# Patient Record
Sex: MALE | Race: WHITE | Employment: FULL TIME | URBAN - METROPOLITAN AREA
[De-identification: names, ages, dates, MRNs, and addresses within clinical notes are randomized per-mention and may not be internally consistent; named-entity substitution may affect disease eponyms.]

---

## 2017-08-04 PROBLEM — L57.0 KERATOSIS: Status: ACTIVE | Noted: 2017-08-04

## 2017-08-04 PROBLEM — R00.2 PALPITATIONS: Status: ACTIVE | Noted: 2017-08-04

## 2017-08-04 PROBLEM — I45.6 ANOMALOUS ATRIOVENTRICULAR EXCITATION: Status: ACTIVE | Noted: 2017-08-04

## 2017-08-04 PROBLEM — R07.89 ATYPICAL CHEST PAIN: Status: ACTIVE | Noted: 2017-08-04

## 2017-08-04 PROBLEM — G43.109 MIGRAINE WITH VERTIGO: Status: ACTIVE | Noted: 2017-08-04

## 2017-08-04 PROBLEM — K40.90 INGUINAL HERNIA: Status: ACTIVE | Noted: 2017-08-04

## 2017-08-04 PROBLEM — F43.10 PTSD (POST-TRAUMATIC STRESS DISORDER): Status: ACTIVE | Noted: 2017-08-04

## 2017-08-04 PROBLEM — I45.6 WPW (WOLFF-PARKINSON-WHITE SYNDROME): Status: ACTIVE | Noted: 2017-08-04

## 2017-08-04 PROBLEM — K21.9 GERD (GASTROESOPHAGEAL REFLUX DISEASE): Status: ACTIVE | Noted: 2017-08-04

## 2017-08-04 PROBLEM — J30.9 ALLERGIC RHINITIS: Status: ACTIVE | Noted: 2017-08-04

## 2017-08-04 PROBLEM — R42 VERTIGO: Status: ACTIVE | Noted: 2017-08-04

## 2017-08-04 PROBLEM — G43.109 MIGRAINE WITH VERTIGO: Status: RESOLVED | Noted: 2017-08-04 | Resolved: 2017-08-04

## 2017-08-04 PROBLEM — C44.121: Status: ACTIVE | Noted: 2017-08-04

## 2017-08-04 PROBLEM — F41.1 GENERALIZED ANXIETY DISORDER: Status: ACTIVE | Noted: 2017-08-04

## 2017-08-04 PROBLEM — E78.5 DYSLIPIDEMIA: Status: ACTIVE | Noted: 2017-08-04

## 2021-02-04 PROBLEM — I49.3 PVC (PREMATURE VENTRICULAR CONTRACTION): Status: ACTIVE | Noted: 2021-02-04

## 2021-03-01 ENCOUNTER — HOSPITAL ENCOUNTER (OUTPATIENT)
Dept: LAB | Age: 51
Discharge: HOME | End: 2021-03-01
Payer: COMMERCIAL

## 2021-03-01 DIAGNOSIS — D89.2 HYPERGAMMAGLOBULINEMIA: ICD-10-CM

## 2021-03-01 LAB
ALBUMIN SERPL-MCNC: 4.4 G/DL (ref 3.5–5)
ALBUMIN/GLOB SERPL: 1.1 {RATIO} (ref 1.2–3.5)
ALP SERPL-CCNC: 59 U/L (ref 50–136)
ALT SERPL-CCNC: 27 U/L (ref 12–65)
ANION GAP SERPL CALC-SCNC: 7 MMOL/L (ref 7–16)
AST SERPL-CCNC: 16 U/L (ref 15–37)
BASOPHILS # BLD: 0 K/UL (ref 0–0.2)
BASOPHILS NFR BLD: 0 % (ref 0–2)
BILIRUB SERPL-MCNC: 0.4 MG/DL (ref 0.2–1.1)
BUN SERPL-MCNC: 10 MG/DL (ref 6–23)
CALCIUM SERPL-MCNC: 9.6 MG/DL (ref 8.3–10.4)
CHLORIDE SERPL-SCNC: 103 MMOL/L (ref 98–107)
CO2 SERPL-SCNC: 27 MMOL/L (ref 21–32)
CREAT SERPL-MCNC: 1.1 MG/DL (ref 0.8–1.5)
DIFFERENTIAL METHOD BLD: NORMAL
EOSINOPHIL # BLD: 0.1 K/UL (ref 0–0.8)
EOSINOPHIL NFR BLD: 1 % (ref 0.5–7.8)
ERYTHROCYTE [DISTWIDTH] IN BLOOD BY AUTOMATED COUNT: 12.6 % (ref 11.9–14.6)
GLOBULIN SER CALC-MCNC: 4 G/DL (ref 2.3–3.5)
GLUCOSE SERPL-MCNC: 96 MG/DL (ref 65–100)
HCT VFR BLD AUTO: 45 % (ref 41.1–50.3)
HGB BLD-MCNC: 15.5 G/DL (ref 13.6–17.2)
IMM GRANULOCYTES # BLD AUTO: 0 K/UL (ref 0–0.5)
IMM GRANULOCYTES NFR BLD AUTO: 0 % (ref 0–5)
LDH SERPL L TO P-CCNC: 165 U/L (ref 100–190)
LYMPHOCYTES # BLD: 1.7 K/UL (ref 0.5–4.6)
LYMPHOCYTES NFR BLD: 36 % (ref 13–44)
MCH RBC QN AUTO: 31.6 PG (ref 26.1–32.9)
MCHC RBC AUTO-ENTMCNC: 34.4 G/DL (ref 31.4–35)
MCV RBC AUTO: 91.8 FL (ref 79.6–97.8)
MONOCYTES # BLD: 0.5 K/UL (ref 0.1–1.3)
MONOCYTES NFR BLD: 10 % (ref 4–12)
NEUTS SEG # BLD: 2.5 K/UL (ref 1.7–8.2)
NEUTS SEG NFR BLD: 53 % (ref 43–78)
NRBC # BLD: 0 K/UL (ref 0–0.2)
PLATELET # BLD AUTO: 302 K/UL (ref 150–450)
PMV BLD AUTO: 9.6 FL (ref 9.4–12.3)
POTASSIUM SERPL-SCNC: 3.6 MMOL/L (ref 3.5–5.1)
PROT SERPL-MCNC: 8.4 G/DL (ref 6.3–8.2)
RBC # BLD AUTO: 4.9 M/UL (ref 4.23–5.67)
SODIUM SERPL-SCNC: 137 MMOL/L (ref 136–145)
URATE SERPL-MCNC: 6.6 MG/DL (ref 2.6–6)
WBC # BLD AUTO: 4.8 K/UL (ref 4.3–11.1)

## 2021-03-01 PROCEDURE — 86334 IMMUNOFIX E-PHORESIS SERUM: CPT

## 2021-03-01 PROCEDURE — 36415 COLL VENOUS BLD VENIPUNCTURE: CPT

## 2021-03-01 PROCEDURE — 84550 ASSAY OF BLOOD/URIC ACID: CPT

## 2021-03-01 PROCEDURE — 85025 COMPLETE CBC W/AUTO DIFF WBC: CPT

## 2021-03-01 PROCEDURE — 83615 LACTATE (LD) (LDH) ENZYME: CPT

## 2021-03-01 PROCEDURE — 80053 COMPREHEN METABOLIC PANEL: CPT

## 2021-03-01 PROCEDURE — 84165 PROTEIN E-PHORESIS SERUM: CPT

## 2021-03-01 PROCEDURE — 88185 FLOWCYTOMETRY/TC ADD-ON: CPT

## 2021-03-01 PROCEDURE — 88184 FLOWCYTOMETRY/ TC 1 MARKER: CPT

## 2021-03-01 PROCEDURE — 83883 ASSAY NEPHELOMETRY NOT SPEC: CPT

## 2021-03-02 LAB
KAPPA LC FREE SER-MCNC: 21.52 MG/L (ref 3.3–19.4)
KAPPA LC FREE/LAMBDA FREE SER: 1.78 {RATIO} (ref 0.26–1.65)
LAMBDA LC FREE SERPL-MCNC: 12.08 MG/L (ref 5.71–26.3)

## 2021-03-03 LAB
ALBUMIN SERPL ELPH-MCNC: 4.15 G/DL (ref 3.2–5.6)
ALBUMIN/GLOB SERPL: 1.1 {RATIO}
ALPHA1 GLOB SERPL ELPH-MCNC: 0.18 G/DL (ref 0.1–0.4)
ALPHA2 GLOB SERPL ELPH-MCNC: 0.64 G/DL (ref 0.4–1.2)
B-GLOBULIN SERPL QL ELPH: 1.27 G/DL (ref 0.6–1.3)
GAMMA GLOB MFR SERPL ELPH: 1.65 G/DL (ref 0.5–1.6)
IGA SERPL-MCNC: 252 MG/DL (ref 85–499)
IGG SERPL-MCNC: 1296 MG/DL (ref 610–1616)
IGM SERPL-MCNC: 286 MG/DL (ref 35–242)
M PROTEIN SERPL ELPH-MCNC: ABNORMAL G/DL
PROT PATTERN SERPL ELPH-IMP: ABNORMAL
PROT PATTERN SPEC IFE-IMP: ABNORMAL
PROT SERPL-MCNC: 7.9 G/DL (ref 6.3–8.2)

## 2021-03-08 ENCOUNTER — HOSPITAL ENCOUNTER (OUTPATIENT)
Dept: CT IMAGING | Age: 51
Discharge: HOME | End: 2021-03-08
Attending: INTERNAL MEDICINE

## 2021-03-08 DIAGNOSIS — R14.0 ABDOMINAL BLOATING: ICD-10-CM

## 2021-03-08 DIAGNOSIS — D89.2 HYPERGAMMAGLOBULINEMIA: ICD-10-CM

## 2021-03-08 RX ORDER — SODIUM CHLORIDE 0.9 % (FLUSH) 0.9 %
10 SYRINGE (ML) INJECTION
Status: COMPLETED | OUTPATIENT
Start: 2021-03-08 | End: 2021-03-08

## 2021-03-08 RX ADMIN — Medication 10 ML: at 14:22

## 2021-03-11 LAB
FLOW CYTOMETRY, FBTC1: NORMAL
SPECIMEN SOURCE: NORMAL
TEST ORDERED:: NORMAL

## 2021-11-23 ENCOUNTER — HOSPITAL ENCOUNTER (OUTPATIENT)
Dept: LAB | Age: 51
Discharge: HOME | End: 2021-11-23

## 2021-11-23 PROCEDURE — 88305 TISSUE EXAM BY PATHOLOGIST: CPT

## 2022-03-18 PROBLEM — J30.9 ALLERGIC RHINITIS: Status: ACTIVE | Noted: 2017-08-04

## 2022-03-18 PROBLEM — L57.0 KERATOSIS: Status: ACTIVE | Noted: 2017-08-04

## 2022-03-18 PROBLEM — F43.10 PTSD (POST-TRAUMATIC STRESS DISORDER): Status: ACTIVE | Noted: 2017-08-04

## 2022-03-18 PROBLEM — I45.6 ANOMALOUS ATRIOVENTRICULAR EXCITATION: Status: ACTIVE | Noted: 2017-08-04

## 2022-03-19 PROBLEM — R07.89 ATYPICAL CHEST PAIN: Status: ACTIVE | Noted: 2017-08-04

## 2022-03-19 PROBLEM — K40.90 INGUINAL HERNIA: Status: ACTIVE | Noted: 2017-08-04

## 2022-03-19 PROBLEM — I49.3 PVC (PREMATURE VENTRICULAR CONTRACTION): Status: ACTIVE | Noted: 2021-02-04

## 2022-03-19 PROBLEM — C44.121: Status: ACTIVE | Noted: 2017-08-04

## 2022-03-19 PROBLEM — R42 VERTIGO: Status: ACTIVE | Noted: 2017-08-04

## 2022-03-19 PROBLEM — E78.5 DYSLIPIDEMIA: Status: ACTIVE | Noted: 2017-08-04

## 2022-03-19 PROBLEM — F41.1 GENERALIZED ANXIETY DISORDER: Status: ACTIVE | Noted: 2017-08-04

## 2022-03-20 PROBLEM — R00.2 PALPITATIONS: Status: ACTIVE | Noted: 2017-08-04

## 2022-03-20 PROBLEM — K21.9 GERD (GASTROESOPHAGEAL REFLUX DISEASE): Status: ACTIVE | Noted: 2017-08-04

## 2022-03-20 PROBLEM — I45.6 WPW (WOLFF-PARKINSON-WHITE SYNDROME): Status: ACTIVE | Noted: 2017-08-04

## 2023-10-20 ENCOUNTER — TELEPHONE (OUTPATIENT)
Age: 53
End: 2023-10-20

## 2023-10-20 NOTE — TELEPHONE ENCOUNTER
Pt states PCP instructed him to call to get f/u appt for SOB, and some dizziness. Last OV 2021. Appt scheduled 11/22 at 10:45.

## 2023-11-17 ENCOUNTER — TELEPHONE (OUTPATIENT)
Age: 53
End: 2023-11-17

## 2023-11-17 NOTE — TELEPHONE ENCOUNTER
Spoke to pt. Made aware Dr. Camille Brooks recommends general cardiology consult. Pt confirms he is not having issues with tachycardia or irregular rhythm. Appt scheduled with Dr. Joshua Lagos in [place] on 12/4 at 9:40. Verb understanding.

## 2023-12-04 ENCOUNTER — OFFICE VISIT (OUTPATIENT)
Age: 53
End: 2023-12-04
Payer: COMMERCIAL

## 2023-12-04 VITALS
DIASTOLIC BLOOD PRESSURE: 76 MMHG | HEIGHT: 70 IN | BODY MASS INDEX: 29.49 KG/M2 | WEIGHT: 206 LBS | SYSTOLIC BLOOD PRESSURE: 126 MMHG | HEART RATE: 81 BPM

## 2023-12-04 DIAGNOSIS — R07.2 PRECORDIAL CHEST PAIN: ICD-10-CM

## 2023-12-04 DIAGNOSIS — R00.2 PALPITATIONS: Primary | ICD-10-CM

## 2023-12-04 PROCEDURE — 99214 OFFICE O/P EST MOD 30 MIN: CPT | Performed by: INTERNAL MEDICINE

## 2023-12-04 PROCEDURE — 93000 ELECTROCARDIOGRAM COMPLETE: CPT | Performed by: INTERNAL MEDICINE

## 2023-12-04 RX ORDER — ALBUTEROL SULFATE 90 UG/1
2 AEROSOL, METERED RESPIRATORY (INHALATION) EVERY 6 HOURS PRN
COMMUNITY
Start: 2023-02-08

## 2023-12-04 RX ORDER — AZELASTINE 1 MG/ML
1 SPRAY, METERED NASAL 2 TIMES DAILY
COMMUNITY
Start: 2021-02-16

## 2023-12-04 RX ORDER — METOPROLOL TARTRATE 50 MG/1
50 TABLET, FILM COATED ORAL ONCE
Qty: 1 TABLET | Refills: 0 | Status: SHIPPED | OUTPATIENT
Start: 2023-12-04 | End: 2023-12-04

## 2023-12-04 RX ORDER — ROSUVASTATIN CALCIUM 20 MG/1
20 TABLET, COATED ORAL NIGHTLY
COMMUNITY
Start: 2023-11-16

## 2023-12-04 RX ORDER — LOSARTAN POTASSIUM 50 MG/1
50 TABLET ORAL DAILY
COMMUNITY
Start: 2023-09-26

## 2023-12-04 ASSESSMENT — ENCOUNTER SYMPTOMS
ABDOMINAL PAIN: 0
ORTHOPNEA: 0
SHORTNESS OF BREATH: 1

## 2023-12-04 NOTE — PROGRESS NOTES
[facility] CARDIOLOGY  [id number] [place] [address] [facility]  PHONE: [phone]      23    NAME:  Jordyn Don III  : 1970  MRN: [id number]         SUBJECTIVE:   Gregg Holliday is a 48 y.o. male seen for a follow up visit regarding the following:     Chief Complaint   Patient presents with    Shortness of Breath    Dizziness                   HPI:  Follow up  Shortness of Breath and Dizziness (/)   . Mr. Taisha Ayon presents today for follow-up. Patient's been having some shortness of breath with activity and chest pressure over the last year. Feels like is happening more frequently lately. Says that it is not every time he exerts but sometimes when he walks up a flight of steps he will become severely breathless and have some pressure sensation in his chest.  He can also have it at rest.  Patient has no prior history of coronary disease. He has been following with Dr. Kerri Lorenz for tachycardia and PVCs in the past.  Is been a couple years since he has been seen. He is a former smoker quit many years ago. Also history of hypertension and hyperlipidemia which are both longstanding. Shortness of Breath  Associated symptoms include chest pain. Pertinent negatives include no abdominal pain, claudication, fever, leg swelling, orthopnea or PND. Dizziness  Associated symptoms include chest pain. Pertinent negatives include no abdominal pain, chills, diaphoresis or fever. Key CAD CHF Meds            losartan (COZAAR) 50 MG tablet (Taking)    Class: Historical Med    rosuvastatin (CRESTOR) 20 MG tablet (Taking)    Class: Historical Med    metoprolol tartrate (LOPRESSOR) 50 MG tablet (Taking)    Sig - Route: Take 1 tablet by mouth once for 1 dose Take tablet 1 hour prior to procedure. - Oral          Key Antihyperglycemic Medications       Patient is on no antihyperglycemic meds.                 Past Medical History, Past Surgical History,

## 2023-12-14 ENCOUNTER — TELEPHONE (OUTPATIENT)
Age: 53
End: 2023-12-14